# Patient Record
Sex: FEMALE | Race: WHITE | URBAN - METROPOLITAN AREA
[De-identification: names, ages, dates, MRNs, and addresses within clinical notes are randomized per-mention and may not be internally consistent; named-entity substitution may affect disease eponyms.]

---

## 2017-07-31 ENCOUNTER — HISTORICAL (OUTPATIENT)
Dept: ADMINISTRATIVE | Facility: HOSPITAL | Age: 37
End: 2017-07-31

## 2019-05-20 ENCOUNTER — HISTORICAL (OUTPATIENT)
Dept: ADMINISTRATIVE | Facility: HOSPITAL | Age: 39
End: 2019-05-20

## 2022-05-02 NOTE — HISTORICAL [facility]
This is a historical note converted from [employer]. Formatting and pictures may have been removed.  Please reference [place] for original formatting and attached multimedia. Chief Complaint  Right knee pain. Pt states it feels like a bruise when she pushes on it. Knee only hurts when shes doing something. Been hurting since March 2019.  History of Present Illness  Patient comes in today complaining of right knee pain. ?Patient states she feels like a bruise underneath her kneecap, she states it is painful on kneeling going down stairs as well as getting up from a seated position.? She feels?unevenness in her right kneecap area. ?She is tried rest of medication?without relief. ?She states that is been going on for the last 2+ months.  Physical Exam  Vitals & Measurements  T:?36.4? ?C (Oral)? HR:?73(Peripheral)? BP:?98/68?  HT:?162?cm? WT:?63.5?kg? BMI:?24.2?  Right lower extremity form soft and warm. ?Skin is intact and there are no signs or symptoms of DVT or infection. ?Examination of the right knee there is no obvious effusion, she is tender about the patellofemoral joint positive talar grind negative apprehension questionable J sign she has full extension she is able to flex 115 degrees stable to stressing negative McMurrays she is tender about the?fat pad underneath her?patella tendon, she is mildly tender along the tibial tubercle. ?She is otherwise stable to stressing she is neurovascular intact distally. ?X-rays 3 views right knee demonstrate no obvious fracture dislocation.  Assessment/Plan  1.?Chondromalacia, right knee?M94.261  ? At this time we discussed her physical exam and x-ray findings. ?We have discussed impingement of the anterior knee. ?We have discussed her patella tracking. ?Under sterile technique she tolerated the steroid injection very well to the anterolateral portal of her right knee. ?This was 2 cc of dexamethasone 5 cc 1% lidocaine without. ?We have discussed the importance of quad strength,  patella tracking low impact activities I would like to see her back in 2 months to see how she is progressing.  Ordered:  meloxicam, 15 mg = 1 tab(s), Oral, Daily, # 30 tab(s), 0 Refill(s), Pharmacy: [facility] [id number]  1126F Pain severity quantified, no pain present, 05/20/19 9:04:00 CDT  3008F Body Mass Index (BMI), documented, 05/20/19 9:04:00 CDT  3074F Most recent systolic blood pressure, less than 130 mm Hg, 05/20/19 9:04:00 CDT  3078F Most recent diastolic blood pressure, less than 80 mm Hg, 05/20/19 9:04:00 CDT  asp/inj jnt/bursa, [place] [id number] PC, 05/20/19 9:04:00 CDT, [facility], Routine, 05/20/19 9:04:00 CDT  Clinic Follow up, *Est. 07/01/19 3:00:00 CDT, Order for future visit, Chondromalacia, right knee  Maltracking of right patella  Impingement of knee joint, [facility]  Office/Outpatient Visit Level 4 Established [id number] PC, Chondromalacia, right knee  Maltracking of right patella  Impingement of knee joint, [facility], 05/20/19 9:04:00 CDT  ?  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 07/01/19 3:00:00 CDT, Order for future visit, Chondromalacia, right knee  Maltracking of right patella  Impingement of knee joint, Orthopa[facility]  Office/Outpatient Visit Level 4 Established [id number] PC, Chondromalacia, right knee  Maltracking of right patella  Impingement of knee joint, [facility] Clinic, 05/20/19 9:04:00  CDT  ?  3.?Impingement of knee joint?M25.869  Ordered:  meloxicam, 15 mg = 1 tab(s), Oral, Daily, # 30 tab(s), 0 Refill(s), Pharmacy: [facility] [id number] Pain severity quantified, no pain present, 05/20/19 9:04:00 CDT  3008F Body Mass Index (BMI), documented, 05/20/19 9:04:00 CDT  3074F Most recent systolic blood pressure, less than 130 mm Hg, 05/20/19 9:04:00 CDT  3078F Most recent diastolic blood pressure, less than 80 mm Hg, 05/20/19 9:04:00 CDT  asp/inj jnt/bursa, major [id number] PC, 05/20/19 9:04:00 CDT, [facility], Routine, 05/20/19 9:04:00 CDT  Clinic Follow up, *Est. 07/01/19 3:00:00 CDT, Order for future visit, Chondromalacia, right knee  Maltracking of right patella  Impingement of knee joint, [facility]  Office/Outpatient Visit Level 4 Established [id number] PC, Chondromalacia, right knee  Maltracking of right patella  Impingement of knee joint, [facility], 05/20/19 9:04:00 CDT  ?  Referrals  Clinic Follow up, *Est. 07/01/19 3:00:00 CDT, Order for future visit, Chondromalacia, right knee  Maltracking of right patella  Impingement of knee joint, [facility]   Problem List/Past Medical History  Ongoing  No chronic problems  Historical  No qualifying data  Procedure/Surgical History  Hernia repair   Medications  Mobic 15 mg oral tablet, 15 mg= 1 tab(s), Oral, Daily,? ?Not taking: Last Dose Date/Time Unknown  Mobic 15 mg oral tablet, 15 mg= 1 tab(s), Oral, Daily  Mobic 15 mg oral tablet, 15 mg= 1 tab(s), Oral, Daily,? ?Not taking: Last Dose Date/Time Unknown  Allergies  No Known Medication Allergies  Social History  Alcohol  Current, 07/31/2017  Tobacco  Former smoker, quit more than 30 days ago, N/A, 05/20/2019  Health Maintenance  Health Maintenance  ???Pending?(in the next year)  ??? ??OverDue  ??? ? ? ?Alcohol Misuse Screening due??01/01/19??and every 1??year(s)  ??? ??Due?  ??? ? ? ?ADL Screening due??05/21/19??and every 1??year(s)  ??? ? ? ?Depression Screening  due??05/21/19??and every?  ??? ? ? ?Tetanus Vaccine due??05/21/19??and every 10??year(s)  ??? ??Due In Future?  ??? ? ? ?Obesity Screening not due until??01/01/20??and every 1??year(s)  ??? ? ? ?Blood Pressure Screening not due until??05/19/20??and every 1??year(s)  ??? ? ? ?Body Mass Index Check not due until??05/19/20??and every 1??year(s)  ???Satisfied?(in the past 1 year)  ??? ??Satisfied?  ??? ? ? ?Blood Pressure Screening on??05/20/19.??Satisfied by Martha Rose  ??? ? ? ?Body Mass Index Check on??05/20/19.??Satisfied by Martha Rose  ??? ? ? ?Cervical Cancer Screening on??10/11/18.??Satisfied by Magdalena Lovelace  ??? ? ? ?Obesity Screening on??05/20/19.??Satisfied by Martha Rose  ?  ?

## 2022-05-02 NOTE — HISTORICAL [facility]
This is a historical note converted from [employer]. Formatting and pictures may have been removed.  Please reference [place] for original formatting and attached multimedia. Chief Complaint  left thumb pain. on injury, pain for about 3 months. no treatment.  History of Present Illness  Patient comes in today for first visit. ?Patient is right-handed dominant. ?Patient complains of lump pain for the last 3 months. ?She denies any specific trauma or recent injury.? She complains of?occasionally stiffness in her left thumb occasional popping, as well as pain in her?musculature of the base of the thumb. ?She denies any instability she has tried rest and medication without relief. ?She denies any numbness or tingling she denies any other complaints.  Physical Exam  Vitals & Measurements  HR:?72?(Peripheral)? BP:?109/76? HT:?162?cm? HT:?162?cm? WT:?68.03?kg? WT:?68.03?kg? BMI:?25.92?  Patient is well-nourished well-developed female she is awake alert and oriented ?3 she is in no apparent distress she is pleasant and cooperative. ?Examination of the?left upper extremity compartments are soft and warm. ?Skin is intact. ?There is no signs or symptoms of DVT or infection.? She is tender to palpation about the A1 pulley left thumb. ?There is no gross triggering today though she does have pain with flexion extension.? There is no?signs of swelling or erythema. ?She is so tender base of the ?CMC joint. ?She has a negative Tinels. ?She has a negative CMC grind test. ?She is stable to stressing at 0 and 30?. ?She is nontender in the anatomic snuffbox. ?She is neurovascular intact. ?X-rays 3 views of the left thumb?demonstrate no obvious fracture dislocation.  Assessment/Plan  Pain in left hand  At this time we discussed her physical exam and x-ray findings. ?Patient has likely early beginnings of suspected trigger thumb, we have discussed various treatment options today. ?We will start with a Medrol Dosepak as well as  anti-inflammatories appropriate precautions. ?We have also discussed the thumb today.? I would like to see her back in 4 weeks to see how she is progressing.  ?   Problem List/Past Medical History  No chronic problems  Historical  No historical problems  Procedure/Surgical History  Hernia repair.  Medications  Mobic 15 mg oral tablet, 15 mg, 1 tab(s), Oral, Daily  Allergies  No Known Medication Allergies  Social History  Alcohol  Current  Tobacco  Former smoker

## 2024-05-28 ENCOUNTER — APPOINTMENT (OUTPATIENT)
Dept: LAB | Facility: HOSPITAL | Age: 44
End: 2024-05-28
Attending: OBSTETRICS & GYNECOLOGY

## 2024-05-28 DIAGNOSIS — Z11.3 SCREENING EXAMINATION FOR VENEREAL DISEASE: Primary | ICD-10-CM

## 2024-05-28 LAB — T PALLIDUM AB SER QL: NONREACTIVE

## 2024-05-28 PROCEDURE — 86696 HERPES SIMPLEX TYPE 2 TEST: CPT

## 2024-05-28 PROCEDURE — 36415 COLL VENOUS BLD VENIPUNCTURE: CPT

## 2024-05-28 PROCEDURE — 87389 HIV-1 AG W/HIV-1&-2 AB AG IA: CPT

## 2024-05-28 PROCEDURE — 86780 TREPONEMA PALLIDUM: CPT

## 2024-05-29 LAB — HIV 1+2 AB+HIV1 P24 AG SERPL QL IA: NONREACTIVE

## 2024-05-30 LAB
HSV1 IGG SERPL QL IA: NEGATIVE
HSV2 IGG SERPL QL IA: NEGATIVE

## 2025-04-21 ENCOUNTER — OFFICE VISIT (OUTPATIENT)
Dept: URGENT CARE | Facility: CLINIC | Age: 45
End: 2025-04-21
Payer: MEDICAID

## 2025-04-21 VITALS
BODY MASS INDEX: 26.66 KG/M2 | OXYGEN SATURATION: 98 % | HEIGHT: 65 IN | RESPIRATION RATE: 18 BRPM | SYSTOLIC BLOOD PRESSURE: 116 MMHG | HEART RATE: 91 BPM | WEIGHT: 160 LBS | DIASTOLIC BLOOD PRESSURE: 76 MMHG | TEMPERATURE: 99 F

## 2025-04-21 DIAGNOSIS — R31.9 URINARY TRACT INFECTION WITH HEMATURIA, SITE UNSPECIFIED: Primary | ICD-10-CM

## 2025-04-21 DIAGNOSIS — R31.9 HEMATURIA, UNSPECIFIED TYPE: ICD-10-CM

## 2025-04-21 DIAGNOSIS — N39.0 URINARY TRACT INFECTION WITH HEMATURIA, SITE UNSPECIFIED: Primary | ICD-10-CM

## 2025-04-21 LAB
BILIRUB UR QL STRIP: POSITIVE
GLUCOSE UR QL STRIP: NEGATIVE
KETONES UR QL STRIP: NEGATIVE
LEUKOCYTE ESTERASE UR QL STRIP: POSITIVE
PH, POC UA: 5
POC BLOOD, URINE: POSITIVE
POC NITRATES, URINE: POSITIVE
PROT UR QL STRIP: POSITIVE
SP GR UR STRIP: 1.02 (ref 1–1.03)
UROBILINOGEN UR STRIP-ACNC: ABNORMAL (ref 0.1–1.1)

## 2025-04-21 PROCEDURE — 99203 OFFICE O/P NEW LOW 30 MIN: CPT | Mod: ,,, | Performed by: CLINIC/CENTER

## 2025-04-21 PROCEDURE — 81003 URINALYSIS AUTO W/O SCOPE: CPT | Mod: QW,,, | Performed by: CLINIC/CENTER

## 2025-04-21 PROCEDURE — 87086 URINE CULTURE/COLONY COUNT: CPT | Performed by: CLINIC/CENTER

## 2025-04-21 RX ORDER — NITROFURANTOIN 25; 75 MG/1; MG/1
100 CAPSULE ORAL 2 TIMES DAILY
Qty: 14 CAPSULE | Refills: 0 | Status: SHIPPED | OUTPATIENT
Start: 2025-04-21 | End: 2025-04-28

## 2025-04-21 RX ORDER — ETONOGESTREL AND ETHINYL ESTRADIOL .12; .015 MG/D; MG/D
RING VAGINAL
COMMUNITY
Start: 2025-03-27

## 2025-04-21 NOTE — PROGRESS NOTES
"Subjective:      Patient ID: Marita Goldberg is a 45 y.o. female.    Vitals:  height is 5' 4.57" (1.64 m) and weight is 72.6 kg (160 lb). Her oral temperature is 98.5 °F (36.9 °C). Her blood pressure is 116/76 and her pulse is 91. Her respiration is 18 and oxygen saturation is 98%.     Chief Complaint: Hematuria     Patient is a 45 y.o. female who presents to urgent care with complaints of blood in urine, painful with urination, urgency,  x1 days. Alleviating factors include azo with moderate amount of relief. Patient denies back pain, abdominal pain or fever.      Hematuria  Irritative symptoms include frequency. Associated symptoms include dysuria.     Genitourinary:  Positive for dysuria, frequency and hematuria.      Objective:     Physical Exam   Constitutional: She is oriented to person, place, and time. She appears well-developed. She is cooperative.   HENT:   Head: Normocephalic and atraumatic.   Ears:   Right Ear: Hearing, tympanic membrane, external ear and ear canal normal.   Left Ear: Hearing, tympanic membrane, external ear and ear canal normal.   Nose: Nose normal. No mucosal edema or nasal deformity. No epistaxis. Right sinus exhibits no maxillary sinus tenderness and no frontal sinus tenderness. Left sinus exhibits no maxillary sinus tenderness and no frontal sinus tenderness.   Mouth/Throat: Uvula is midline, oropharynx is clear and moist and mucous membranes are normal. No trismus in the jaw. Normal dentition. No uvula swelling.   Eyes: Conjunctivae and lids are normal.   Neck: Trachea normal and phonation normal. Neck supple.   Cardiovascular: Normal rate, regular rhythm, normal heart sounds and normal pulses.   Pulmonary/Chest: Effort normal and breath sounds normal.   Abdominal: Normal appearance and bowel sounds are normal. Soft. There is no abdominal tenderness. There is no left CVA tenderness and no right CVA tenderness.   Musculoskeletal: Normal range of motion.         General: Normal range " "of motion.   Neurological: She is alert and oriented to person, place, and time. She exhibits normal muscle tone.   Skin: Skin is warm, dry and intact.   Psychiatric: Her speech is normal and behavior is normal. Judgment and thought content normal.   Nursing note and vitals reviewed.         Previous History      Review of patient's allergies indicates:  No Known Allergies    Past Medical History:   Diagnosis Date    Known health problems: none      Current Outpatient Medications   Medication Instructions    ELURYNG 0.12-0.015 mg/24 hr vaginal ring INSERT ONE RING VAGINALLY AND LEAVE IN PLACE FOR 3 CONSECUTIVE WEEKS, THEN REMOVE FOR 1 WEEK. INSERT NEW RING 7 DAYS AFTER THE LAST WAS REMOVED    nitrofurantoin, macrocrystal-monohydrate, (MACROBID) 100 MG capsule 100 mg, Oral, 2 times daily     Past Surgical History:   Procedure Laterality Date    HERNIA REPAIR  2008     Family History   Problem Relation Name Age of Onset    No Known Problems Mother      No Known Problems Father      No Known Problems Sister      No Known Problems Brother         Social History[1]     Physical Exam      Vital Signs Reviewed   /76   Pulse 91   Temp 98.5 °F (36.9 °C) (Oral)   Resp 18   Ht 5' 4.57" (1.64 m)   Wt 72.6 kg (160 lb)   SpO2 98%   BMI 26.98 kg/m²        Procedures    Procedures     Labs     Results for orders placed or performed in visit on 04/21/25   POCT Urinalysis, Dipstick, Automated, W/O Scope    Collection Time: 04/21/25  1:07 PM   Result Value Ref Range    POC Blood, Urine Positive (A) Negative    POC Bilirubin, Urine Positive (A) Negative    POC Urobilinogen, Urine 12 mg 0.1 - 1.1    POC Ketones, Urine Negative Negative    POC Protein, Urine Positive (A) Negative    POC Nitrates, Urine Positive (A) Negative    POC Glucose, Urine Negative Negative    pH, UA 5     POC Specific Gravity, Urine 1.020 1.003 - 1.029    POC Leukocytes, Urine Positive (A) Negative      Assessment:     1. Urinary tract infection with " hematuria, site unspecified    2. Hematuria, unspecified type      Patient's physical exam is benign  Plan:   Present back to the clinic on April 30th to retest your urine after antibiotics are done.  Do not need to see a provider on the day and can be checked in as a nursing visit.  I will be here in the clinic that day for any questions.  Present to an emergency room if he begins having fever, severe abdominal pain, severe back pain.  Call clinic back Friday if symptoms have not improved by then.  Drink plenty of fluids.    Urinary tract infection with hematuria, site unspecified  -     nitrofurantoin, macrocrystal-monohydrate, (MACROBID) 100 MG capsule; Take 1 capsule (100 mg total) by mouth 2 (two) times daily. for 7 days  Dispense: 14 capsule; Refill: 0  -     Urine Culture High Risk    Hematuria, unspecified type  -     POCT Urinalysis, Dipstick, Automated, W/O Scope  -     nitrofurantoin, macrocrystal-monohydrate, (MACROBID) 100 MG capsule; Take 1 capsule (100 mg total) by mouth 2 (two) times daily. for 7 days  Dispense: 14 capsule; Refill: 0                         [1]   Social History  Tobacco Use    Smoking status: Former     Types: Cigarettes     Start date: 2005     Passive exposure: Past    Smokeless tobacco: Never   Substance Use Topics    Alcohol use: Yes     Alcohol/week: 2.0 standard drinks of alcohol     Types: 2 Cans of beer per week     Comment: occasionally    Drug use: Not Currently

## 2025-04-21 NOTE — PROGRESS NOTES
"Subjective:      Patient ID: Marita Goldberg is a 45 y.o. female.    Vitals:  height is 5' 4.57" (1.64 m) and weight is 72.6 kg (160 lb). Her oral temperature is 98.5 °F (36.9 °C). Her blood pressure is 116/76 and her pulse is 91. Her respiration is 18 and oxygen saturation is 98%.     Chief Complaint: Hematuria     Patient is a 45 y.o. female who presents to urgent care with complaints of blood in urine, painful with urination, urgency,  x1 days. Alleviating factors include azo with moderate amount of relief. Patient denies back pain, abdominal pain or fever.        Genitourinary:  Positive for dysuria and frequency.      Objective:     Physical Exam   Constitutional: She is oriented to person, place, and time. She appears well-developed. She is cooperative.   HENT:   Head: Normocephalic and atraumatic.   Ears:   Right Ear: Hearing, tympanic membrane, external ear and ear canal normal.   Left Ear: Hearing, tympanic membrane, external ear and ear canal normal.   Nose: Nose normal. No mucosal edema or nasal deformity. No epistaxis. Right sinus exhibits no maxillary sinus tenderness and no frontal sinus tenderness. Left sinus exhibits no maxillary sinus tenderness and no frontal sinus tenderness.   Mouth/Throat: Uvula is midline, oropharynx is clear and moist and mucous membranes are normal. No trismus in the jaw. Normal dentition. No uvula swelling.   Eyes: Conjunctivae and lids are normal.   Neck: Trachea normal and phonation normal. Neck supple.   Cardiovascular: Normal rate, regular rhythm, normal heart sounds and normal pulses.   Pulmonary/Chest: Effort normal and breath sounds normal.   Abdominal: Normal appearance and bowel sounds are normal. Soft. There is no abdominal tenderness. There is no left CVA tenderness and no right CVA tenderness.   Musculoskeletal: Normal range of motion.         General: Normal range of motion.   Neurological: She is alert and oriented to person, place, and time. She exhibits normal " "muscle tone.   Skin: Skin is warm, dry and intact.   Psychiatric: Her speech is normal and behavior is normal. Judgment and thought content normal.   Nursing note and vitals reviewed.         Previous History      Review of patient's allergies indicates:  No Known Allergies    Past Medical History:   Diagnosis Date    Known health problems: none      Current Outpatient Medications   Medication Instructions    ELURYNG 0.12-0.015 mg/24 hr vaginal ring INSERT ONE RING VAGINALLY AND LEAVE IN PLACE FOR 3 CONSECUTIVE WEEKS, THEN REMOVE FOR 1 WEEK. INSERT NEW RING 7 DAYS AFTER THE LAST WAS REMOVED     Past Surgical History:   Procedure Laterality Date    HERNIA REPAIR  2008     Family History   Problem Relation Name Age of Onset    No Known Problems Mother      No Known Problems Father      No Known Problems Sister      No Known Problems Brother         Social History[1]     Physical Exam      Vital Signs Reviewed   /76   Pulse 91   Temp 98.5 °F (36.9 °C) (Oral)   Resp 18   Ht 5' 4.57" (1.64 m)   Wt 72.6 kg (160 lb)   SpO2 98%   BMI 26.98 kg/m²        Procedures    Procedures     Labs     Results for orders placed or performed in visit on 04/21/25   POCT Urinalysis, Dipstick, Automated, W/O Scope    Collection Time: 04/21/25  1:07 PM   Result Value Ref Range    POC Blood, Urine Positive (A) Negative    POC Bilirubin, Urine Positive (A) Negative    POC Urobilinogen, Urine 12 mg 0.1 - 1.1    POC Ketones, Urine Negative Negative    POC Protein, Urine Positive (A) Negative    POC Nitrates, Urine Positive (A) Negative    POC Glucose, Urine Negative Negative    pH, UA 5     POC Specific Gravity, Urine 1.020 1.003 - 1.029    POC Leukocytes, Urine Positive (A) Negative      Assessment:     1. Hematuria, unspecified type        Plan:   Present back to the clinic on April 30th to retest her urine as a nursing visit.  You do not need to see a provider on this day.  I will be in the clinic that day for any questions.  " Present to an emergency room for any severe abdominal pain, back pain, fever.  We will send her urine off for urine culture.      Hematuria, unspecified type  -     POCT Urinalysis, Dipstick, Automated, W/O Scope                         [1]   Social History  Tobacco Use    Smoking status: Former     Types: Cigarettes     Start date: 2005     Passive exposure: Past    Smokeless tobacco: Never   Substance Use Topics    Alcohol use: Yes     Alcohol/week: 2.0 standard drinks of alcohol     Types: 2 Cans of beer per week     Comment: occasionally    Drug use: Not Currently

## 2025-04-21 NOTE — PATIENT INSTRUCTIONS
Present back to the clinic on April 30th to retest your urine after antibiotics are done.  Do not need to see a provider on the day and can be checked in as a nursing visit.  I will be here in the clinic that day for any questions.  Present to an emergency room if he begins having fever, severe abdominal pain, severe back pain.  Call clinic back Friday if symptoms have not improved by then.  Drink plenty of fluids.

## 2025-04-23 ENCOUNTER — RESULTS FOLLOW-UP (OUTPATIENT)
Dept: URGENT CARE | Facility: CLINIC | Age: 45
End: 2025-04-23
Payer: MEDICAID

## 2025-04-23 ENCOUNTER — TELEPHONE (OUTPATIENT)
Dept: URGENT CARE | Facility: CLINIC | Age: 45
End: 2025-04-23
Payer: MEDICAID

## 2025-04-23 LAB — BACTERIA UR CULT: ABNORMAL

## 2025-04-23 RX ORDER — PHENAZOPYRIDINE HYDROCHLORIDE 200 MG/1
200 TABLET, FILM COATED ORAL 3 TIMES DAILY PRN
Qty: 6 TABLET | Refills: 0 | Status: SHIPPED | OUTPATIENT
Start: 2025-04-23 | End: 2025-04-25

## 2025-04-23 NOTE — TELEPHONE ENCOUNTER
Informed pt that Rx was sent to pharmacy. Pt verbalized understanding and had no further questions or concerns.

## 2025-04-23 NOTE — TELEPHONE ENCOUNTER
----- Message from Aiden Wallace MD sent at 4/23/2025 10:15 AM CDT -----  Regarding: RE: Requesting Pyridum  Pyridium sent to pharmacy  ----- Message -----  From: Nikita Ross RT  Sent: 4/23/2025  10:10 AM CDT  To: [facility] Results  Subject: Requesting Pyridum                               Pt I was seen on 4/21 by Ashu Rebollar for UTI. Pt refused medication for pain but is stating she would like to request having the pyridum  sent to [facility] in [place] on [address].